# Patient Record
Sex: FEMALE | Race: WHITE | Employment: FULL TIME
[De-identification: names, ages, dates, MRNs, and addresses within clinical notes are randomized per-mention and may not be internally consistent; named-entity substitution may affect disease eponyms.]

---

## 2017-01-25 ENCOUNTER — OFFICE VISIT (OUTPATIENT)
Dept: OBGYN | Facility: CLINIC | Age: 52
End: 2017-01-25

## 2017-01-25 VITALS
WEIGHT: 125 LBS | DIASTOLIC BLOOD PRESSURE: 70 MMHG | HEIGHT: 66 IN | SYSTOLIC BLOOD PRESSURE: 102 MMHG | BODY MASS INDEX: 20.09 KG/M2

## 2017-01-25 DIAGNOSIS — Z01.419 WOMEN'S ANNUAL ROUTINE GYNECOLOGICAL EXAMINATION: Primary | ICD-10-CM

## 2017-01-25 DIAGNOSIS — Z12.31 ENCOUNTER FOR SCREENING MAMMOGRAM FOR BREAST CANCER: ICD-10-CM

## 2017-01-25 PROCEDURE — 99396 PREV VISIT EST AGE 40-64: CPT | Performed by: NURSE PRACTITIONER

## 2017-01-25 ASSESSMENT — ENCOUNTER SYMPTOMS
SHORTNESS OF BREATH: 0
DIARRHEA: 0
BACK PAIN: 0
WHEEZING: 0
RHINORRHEA: 0
CONSTIPATION: 0
ABDOMINAL PAIN: 0
COUGH: 0

## 2017-04-12 ENCOUNTER — EMPLOYEE WELLNESS (OUTPATIENT)
Dept: OTHER | Age: 52
End: 2017-04-12

## 2017-04-12 LAB
CHOLESTEROL/HDL RATIO: 1.8
CHOLESTEROL: 193 MG/DL
GLUCOSE BLD-MCNC: 87 MG/DL (ref 70–99)
HDLC SERPL-MCNC: 105 MG/DL
LDL CHOLESTEROL: 77 MG/DL (ref 0–130)
PATIENT FASTING?: YES
TRIGL SERPL-MCNC: 57 MG/DL
VLDLC SERPL CALC-MCNC: NORMAL MG/DL (ref 1–30)

## 2017-09-06 ENCOUNTER — HOSPITAL ENCOUNTER (OUTPATIENT)
Age: 52
Discharge: HOME | End: 2017-09-06
Payer: COMMERCIAL

## 2017-09-06 LAB
ABSOLUTE EOS #: 0.1 K/UL (ref 0–0.4)
ABSOLUTE LYMPH #: 1.4 K/UL (ref 1–4.8)
ABSOLUTE MONO #: 0.5 K/UL (ref 0.1–1.2)
ALBUMIN SERPL-MCNC: 4.3 G/DL (ref 3.5–5.2)
ALBUMIN/GLOBULIN RATIO: 1.7 (ref 1–2.5)
ALP BLD-CCNC: 54 U/L (ref 35–104)
ALT SERPL-CCNC: 14 U/L (ref 5–33)
ANION GAP SERPL CALCULATED.3IONS-SCNC: 12 MMOL/L (ref 9–17)
AST SERPL-CCNC: 23 U/L
BASOPHILS # BLD: 1 %
BASOPHILS ABSOLUTE: 0 K/UL (ref 0–0.2)
BILIRUB SERPL-MCNC: 0.45 MG/DL (ref 0.3–1.2)
BILIRUBIN URINE: NEGATIVE
BUN BLDV-MCNC: 21 MG/DL (ref 6–20)
BUN/CREAT BLD: ABNORMAL (ref 9–20)
CALCIUM SERPL-MCNC: 9.2 MG/DL (ref 8.6–10.4)
CHLORIDE BLD-SCNC: 106 MMOL/L (ref 98–107)
CHOLESTEROL/HDL RATIO: 1.9
CHOLESTEROL: 197 MG/DL
CO2: 27 MMOL/L (ref 20–31)
COLOR: YELLOW
COMMENT UA: NORMAL
CREAT SERPL-MCNC: 0.62 MG/DL (ref 0.5–0.9)
DIFFERENTIAL TYPE: NORMAL
EOSINOPHILS RELATIVE PERCENT: 3 %
GFR AFRICAN AMERICAN: >60 ML/MIN
GFR NON-AFRICAN AMERICAN: >60 ML/MIN
GFR SERPL CREATININE-BSD FRML MDRD: ABNORMAL ML/MIN/{1.73_M2}
GFR SERPL CREATININE-BSD FRML MDRD: ABNORMAL ML/MIN/{1.73_M2}
GLUCOSE BLD-MCNC: 92 MG/DL (ref 70–99)
GLUCOSE URINE: NEGATIVE
HCT VFR BLD CALC: 40.8 % (ref 36–46)
HDLC SERPL-MCNC: 104 MG/DL
HEMOGLOBIN: 13.7 G/DL (ref 12–16)
KETONES, URINE: NEGATIVE
LDL CHOLESTEROL: 84 MG/DL (ref 0–130)
LEUKOCYTE ESTERASE, URINE: NEGATIVE
LYMPHOCYTES # BLD: 24 %
MCH RBC QN AUTO: 30.6 PG (ref 26–34)
MCHC RBC AUTO-ENTMCNC: 33.7 G/DL (ref 31–37)
MCV RBC AUTO: 90.8 FL (ref 80–100)
MONOCYTES # BLD: 8 %
NITRITE, URINE: NEGATIVE
PDW BLD-RTO: 13.8 % (ref 12.5–15.4)
PH UA: 6 (ref 5–8)
PLATELET # BLD: 163 K/UL (ref 140–450)
PLATELET ESTIMATE: NORMAL
PMV BLD AUTO: 9.1 FL (ref 6–12)
POTASSIUM SERPL-SCNC: 4.3 MMOL/L (ref 3.7–5.3)
PROTEIN UA: NEGATIVE
RBC # BLD: 4.49 M/UL (ref 4–5.2)
RBC # BLD: NORMAL 10*6/UL
SEG NEUTROPHILS: 64 %
SEGMENTED NEUTROPHILS ABSOLUTE COUNT: 3.8 K/UL (ref 1.8–7.7)
SODIUM BLD-SCNC: 145 MMOL/L (ref 135–144)
SPECIFIC GRAVITY UA: 1.02 (ref 1–1.03)
TOTAL PROTEIN: 6.9 G/DL (ref 6.4–8.3)
TRIGL SERPL-MCNC: 44 MG/DL
TURBIDITY: CLEAR
URINE HGB: NEGATIVE
UROBILINOGEN, URINE: NORMAL
VLDLC SERPL CALC-MCNC: NORMAL MG/DL (ref 1–30)
WBC # BLD: 5.9 K/UL (ref 3.5–11)
WBC # BLD: NORMAL 10*3/UL

## 2017-09-06 PROCEDURE — 81003 URINALYSIS AUTO W/O SCOPE: CPT

## 2017-09-06 PROCEDURE — 80061 LIPID PANEL: CPT

## 2017-09-06 PROCEDURE — 85025 COMPLETE CBC W/AUTO DIFF WBC: CPT

## 2017-09-06 PROCEDURE — 80053 COMPREHEN METABOLIC PANEL: CPT

## 2017-09-06 PROCEDURE — 36415 COLL VENOUS BLD VENIPUNCTURE: CPT

## 2017-10-04 ENCOUNTER — HOSPITAL ENCOUNTER (OUTPATIENT)
Dept: PHYSICAL THERAPY | Facility: CLINIC | Age: 52
Setting detail: THERAPIES SERIES
Discharge: HOME | End: 2017-10-04
Payer: COMMERCIAL

## 2017-10-04 PROCEDURE — 97110 THERAPEUTIC EXERCISES: CPT

## 2017-10-04 PROCEDURE — 97530 THERAPEUTIC ACTIVITIES: CPT

## 2017-10-04 PROCEDURE — 97161 PT EVAL LOW COMPLEX 20 MIN: CPT

## 2017-10-04 NOTE — CONSULTS
[] Sohail Last        Outpatient Physical                Therapy       [address]       Phone: [phone]       Fax: [phone] [] [facility]       Promotion at [address] [facility]       Phone: [phone]       Fax: [phone] [x] More[facility] Malave      for Health Promotion    [address] [facility]     Phone: [phone]     Fax:  [phone]      Physical Therapy General Evaluation     Date:  10/4/2017  Patient: Agusto Sadler                                        : 1965                                        MRN: [id number]  Physician: Makenna Leon  Insurance: MMO/Fpath(no vs limit)  Medical Diagnosis: Urinary Incontinence    Rehab Codes: M62.5  Onset Date: 17(script)   Next 's appt: PRN      Subjective:   CC:Pt is a 50 yo female who presents with complaints stress incontinence that has been ongoing about 2 years.    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

## 2017-10-13 ENCOUNTER — HOSPITAL ENCOUNTER (OUTPATIENT)
Dept: PHYSICAL THERAPY | Facility: CLINIC | Age: 52
Setting detail: THERAPIES SERIES
Discharge: HOME | End: 2017-10-13
Payer: COMMERCIAL

## 2017-10-13 PROCEDURE — 97112 NEUROMUSCULAR REEDUCATION: CPT

## 2017-10-13 PROCEDURE — 97110 THERAPEUTIC EXERCISES: CPT

## 2017-10-25 ENCOUNTER — HOSPITAL ENCOUNTER (OUTPATIENT)
Dept: PHYSICAL THERAPY | Facility: CLINIC | Age: 52
Setting detail: THERAPIES SERIES
Discharge: HOME | End: 2017-10-25
Payer: COMMERCIAL

## 2017-10-25 PROCEDURE — 97110 THERAPEUTIC EXERCISES: CPT

## 2017-10-25 PROCEDURE — 97112 NEUROMUSCULAR REEDUCATION: CPT

## 2017-10-27 ENCOUNTER — APPOINTMENT (OUTPATIENT)
Dept: PHYSICAL THERAPY | Facility: CLINIC | Age: 52
End: 2017-10-27
Payer: COMMERCIAL

## 2017-11-21 ENCOUNTER — HOSPITAL ENCOUNTER (OUTPATIENT)
Dept: PHYSICAL THERAPY | Facility: CLINIC | Age: 52
Setting detail: THERAPIES SERIES
Discharge: HOME | End: 2017-11-21
Payer: COMMERCIAL

## 2017-11-21 PROCEDURE — 97110 THERAPEUTIC EXERCISES: CPT

## 2017-11-21 PROCEDURE — 97112 NEUROMUSCULAR REEDUCATION: CPT

## 2017-11-21 NOTE — FLOWSHEET NOTE
[] Liliana Gallegos       Outpatient Physical        Therapy       [address]       Phone: [phone]       Fax: [phone] [] [facility] at [address]       Phone: [phone]       Fax: [phone] [x] Matteo. [address]   Phone: [phone]   Fax:  [phone]     Physical Therapy Daily Treatment Note/Discharge Note    Date:  2017  Patient Name:  Oscar Huertas    :  1965  MRN: [id number]  Physician: Osiel Anthony  Insurance: MMO/Fpath(no vs limit)  Medical Diagnosis: Urinary Incontinence    Rehab Codes: M62.5  Onset Date: 17(script)              Next 's appt: PRN    Visit# / total visits: /  Cancels/No Shows:     Subjective:    Pain:  [] Yes  [x] No Location:  N/A Pain Rating: (0-10 scale) 0/10  Pain altered Tx:  [x] No  [] Yes  Action:  Comments:Pt states that she is pleased with her progress. No leaking and is able to hold her urine during a whole exercise session. Objective:  Modalities:   Precautions:  Exercises:  Exercise Reps/ Time Weight/ Level Comments   Pelvic model explanation            Urine stop test            PF ex: long and quick             The knack ex              bridges/clam shell ex            Other:    Specific Instructions for next treatment:    Treatment Charges: Mins Units   []  Modalities     [x]  Ther Exercise 15 1   []  Manual Therapy     []  Ther Activities     []  Aquatics     []  Vasocompression     [x]  Other: NR 10 1   Total Treatment time 25 2       Assessment: [x] Progressing toward goals. [] No change. [x] Other: Hooked pt up to biofeedback. Great progress noted. Low resting tone. Hold time of 9-10 seconds. Good coordination noted. Reviewed HEP. Discussion with pt that I feel she can cont with her Independent HEP at this time. Pt in agreement. Will discharge PT at this time. Goals:.   STG: (to be met in 4 treatments)  1. Able to isolate PF musculature_MET  2. ? Strength:PF 3/5-MET  3. ? Function:no reports of leaking with cough, sneeze and laugh-MET  4. Independent with Home Exercise Programs-MET  LTG: (to be met in 8 treatments)  1. PF 4/5-MET  2. Able to perform all advanced ADL's without leaking-MET        Pt. Education:  [x] Yes  [] No  [x] Reviewed Prior HEP/Ed  Method of Education: [x] Verbal  [] Demo  [] Written  Comprehension of Education:  [x] Verbalizes understanding. [x] Demonstrates understanding. [] Needs review. [] Demonstrates/verbalizes HEP/Ed previously given. Plan: [] Continue per plan of care.    [x] Other:Discharge PT      Time In:1000            Time Out: 7045    Electronically signed by:  Nanda Brambila PT

## 2017-11-22 NOTE — FLOWSHEET NOTE
pt return in a couple of weeks to allow ample time to work on [employer]. Goals:. STG: (to be met in 4 treatments)  1. Able to isolate PF musculature  2. ? Strength:PF 3/5-  3. ? Function:no reports of leaking with cough, sneeze and laugh-  4. Independent with Home Exercise Programs-  LTG: (to be met in 8 treatments)  1. PF 4/5-       Able to perform all advanced ADL's without leaking-      Pt. Education:  [x] Yes  [] No  [x] Reviewed Prior HEP/Ed  Method of Education: [x] Verbal  [] Demo  [] Written  Comprehension of Education:  [x] Verbalizes understanding. [x] Demonstrates understanding. [] Needs review. [] Demonstrates/verbalizes HEP/Ed previously given. Plan: [x] Continue per plan of care.    [] Other:      Time [id number]:[id number]          Time Out: 7324    Electronically signed by:  Ayden Camarillo, PT

## 2017-11-22 NOTE — FLOWSHEET NOTE
[] Estela Schuler       Outpatient Physical        Therapy       [address]       Phone: [phone]       Fax: [phone] [] [facility] at [address]       Phone: [phone]       Fax: [phone] [x] Matteo. [address] [facility]  [address] [place] [address]   Phone: [phone]   Fax:  [phone]     Physical Therapy Daily Treatment Note/Discharge Note    Date:  10/25/2017  Patient Name:  Aydin Ochoa    :  1965  MRN: [id number]  Physician: Cindy Bello  Insurance: MMO/Fpath(no vs limit)  Medical Diagnosis: Urinary Incontinence    Rehab Codes: M62.5  Onset Date: 17(script)              Next 's appt: PRN    Visit# / total visits: 3  Cancels/No Shows:     Subjective:    Pain:  [] Yes  [x] No Location:  N/A Pain Rating: (0-10 scale) 0/10  Pain altered Tx:  [x] No  [] Yes  Action:  Comments:Pt states that she has really been doing well. Objective:  Modalities:   Precautions:  Exercises:  Exercise Reps/ Time Weight/ Level Comments   Pelvic model explanation            Urine stop test            PF ex: long and quick             The knack ex              bridges/clam shell ex            Other:    Specific Instructions for next treatment:Biofeedback    Treatment Charges: Mins Units   []  Modalities     [x]  Ther Exercise 25 2   []  Manual Therapy     []  Ther Activities     []  Aquatics     []  Vasocompression     [x]  Other: NR 15 1   Total Treatment time 40 3       Assessment: [x] Progressing toward goals. [] No change. [x] Other: Hooked pt up to biofeedback. Noted lower resting tone. Able to isolate PF musculature. Hold time of about 5 seconds. Coordination improved. Workeded with karl graphic on strength, relaxation and force closure. Pt with improved understanding. Cont with EX. Reviewed HEP. Will have pt return in a few weeks to allow ample time to work on [employer]. Goals:.   STG: (to be met in 4 treatments)  1. Able to isolate PF musculature  2. ? Strength:PF 3/5-  3. ? Function:no reports of leaking with cough, sneeze and laugh-  4. Independent with Home Exercise Programs-  LTG: (to be met in 8 treatments)  1. PF 4/5-       Able to perform all advanced ADL's without leaking-      Pt. Education:  [x] Yes  [] No  [x] Reviewed Prior HEP/Ed  Method of Education: [x] Verbal  [] Demo  [] Written  Comprehension of Education:  [x] Verbalizes understanding. [x] Demonstrates understanding. [] Needs review. [] Demonstrates/verbalizes HEP/Ed previously given. Plan: [x] Continue per plan of care.    [] Other:      Time In:1300          Time Out: 1350    Electronically signed by:  Kenroy Smith, PT

## 2018-01-30 ENCOUNTER — HOSPITAL ENCOUNTER (OUTPATIENT)
Age: 53
Setting detail: SPECIMEN
Discharge: HOME | End: 2018-01-30
Payer: COMMERCIAL

## 2018-01-30 ENCOUNTER — OFFICE VISIT (OUTPATIENT)
Dept: OBGYN CLINIC | Age: 53
End: 2018-01-30
Payer: COMMERCIAL

## 2018-01-30 VITALS
BODY MASS INDEX: 21.43 KG/M2 | WEIGHT: 128.6 LBS | SYSTOLIC BLOOD PRESSURE: 92 MMHG | DIASTOLIC BLOOD PRESSURE: 68 MMHG | HEIGHT: 65 IN

## 2018-01-30 DIAGNOSIS — Z12.31 ENCOUNTER FOR SCREENING MAMMOGRAM FOR BREAST CANCER: ICD-10-CM

## 2018-01-30 DIAGNOSIS — Z01.419 ENCOUNTER FOR GYNECOLOGICAL EXAMINATION WITHOUT ABNORMAL FINDING: Primary | ICD-10-CM

## 2018-01-30 PROCEDURE — 99396 PREV VISIT EST AGE 40-64: CPT | Performed by: NURSE PRACTITIONER

## 2018-01-30 ASSESSMENT — ENCOUNTER SYMPTOMS
BACK PAIN: 0
ABDOMINAL DISTENTION: 0
CONSTIPATION: 0
ABDOMINAL PAIN: 0
SHORTNESS OF BREATH: 0
COUGH: 0
DIARRHEA: 0

## 2018-01-30 NOTE — PROGRESS NOTES
breath. Cardiovascular: Negative for chest pain and palpitations. Gastrointestinal: Negative for abdominal distention, abdominal pain, constipation and diarrhea. Genitourinary: Negative for flank pain, frequency, menstrual problem, pelvic pain and vaginal discharge. Musculoskeletal: Negative for back pain. Neurological: Negative for syncope and headaches. Psychiatric/Behavioral: Negative for behavioral problems. Objective:     BP 92/68 (Site: Right Arm, Position: Sitting, Cuff Size: Medium Adult)   Ht 5' 5.25\" (1.657 m)   Wt 128 lb 9.6 oz (58.3 kg)   Breastfeeding? No   BMI 21.24 kg/m²   Physical Exam   Constitutional: She is oriented to person, place, and time. She appears well-developed and well-nourished. Eyes: Pupils are equal, round, and reactive to light. Neck: No tracheal deviation present. No thyromegaly present. Cardiovascular: Normal rate, regular rhythm and normal heart sounds. Pulmonary/Chest: Effort normal and breath sounds normal. No respiratory distress. Right breast exhibits no inverted nipple. Left breast exhibits no inverted nipple, no mass, no nipple discharge, no skin change and no tenderness. Breasts are symmetrical.   Abdominal: Soft. Bowel sounds are normal. She exhibits no distension and no mass. There is no tenderness. There is no CVA tenderness. Hernia confirmed negative in the right inguinal area and confirmed negative in the left inguinal area. Genitourinary: No breast swelling, tenderness, discharge or bleeding. There is no rash or lesion on the right labia. There is no rash or lesion on the left labia. Uterus is not deviated, not enlarged and not fixed. Cervix exhibits no motion tenderness, no discharge and no friability. Right adnexum displays no mass, no tenderness and no fullness. Left adnexum displays no mass, no tenderness and no fullness. No tenderness in the vagina. No vaginal discharge found.    Musculoskeletal: She exhibits no edema or tenderness. Lymphadenopathy:     She has no cervical adenopathy. Right: No inguinal adenopathy present. Left: No inguinal adenopathy present. Neurological: She is alert and oriented to person, place, and time. Skin: Skin is warm and dry. Psychiatric: She has a normal mood and affect. Her behavior is normal. Judgment and thought content normal.         Assessment:     1. Encounter for gynecological examination without abnormal finding  PAP SMEAR   2. Encounter for screening mammogram for breast cancer  PITER DIGITAL SCREEN W CAD BILATERAL         Plan:   1. Pap collected. Discussed new pap smear guidelines. Desires re-pap in 1 year. 2. Screening mammogram discussed and advised yearly if within normal limits. 3. Calcium and Vitamin D dosing reviewed. 4. Colonoscopy screening reviewed. 5. Bone density testing reviewed.      Electronically signed by Azucena Melissa on 1/30/2018

## 2018-02-08 LAB — CYTOLOGY REPORT: NORMAL

## 2018-03-05 ENCOUNTER — HOSPITAL ENCOUNTER (OUTPATIENT)
Dept: MAMMOGRAPHY | Age: 53
Discharge: HOME | End: 2018-03-07
Payer: COMMERCIAL

## 2018-03-05 DIAGNOSIS — Z12.31 ENCOUNTER FOR SCREENING MAMMOGRAM FOR BREAST CANCER: ICD-10-CM

## 2018-03-05 PROCEDURE — 77067 SCR MAMMO BI INCL CAD: CPT

## 2018-03-20 VITALS — WEIGHT: 125 LBS | BODY MASS INDEX: 20.18 KG/M2

## 2019-02-11 ENCOUNTER — HOSPITAL ENCOUNTER (OUTPATIENT)
Age: 54
Setting detail: SPECIMEN
Discharge: HOME | End: 2019-02-11
Payer: COMMERCIAL

## 2019-02-11 ENCOUNTER — OFFICE VISIT (OUTPATIENT)
Dept: OBGYN CLINIC | Age: 54
End: 2019-02-11
Payer: COMMERCIAL

## 2019-02-11 VITALS
BODY MASS INDEX: 20.15 KG/M2 | HEIGHT: 66 IN | SYSTOLIC BLOOD PRESSURE: 104 MMHG | DIASTOLIC BLOOD PRESSURE: 80 MMHG | WEIGHT: 125.4 LBS

## 2019-02-11 DIAGNOSIS — Z01.419 ENCOUNTER FOR GYNECOLOGICAL EXAMINATION: Primary | ICD-10-CM

## 2019-02-11 DIAGNOSIS — N95.1 VAGINAL DRYNESS, MENOPAUSAL: ICD-10-CM

## 2019-02-11 DIAGNOSIS — R68.82 LOW LIBIDO: ICD-10-CM

## 2019-02-11 DIAGNOSIS — Z12.31 ENCOUNTER FOR SCREENING MAMMOGRAM FOR BREAST CANCER: ICD-10-CM

## 2019-02-11 PROCEDURE — 99396 PREV VISIT EST AGE 40-64: CPT | Performed by: NURSE PRACTITIONER

## 2019-02-11 RX ORDER — LANOLIN ALCOHOL/MO/W.PET/CERES
3 CREAM (GRAM) TOPICAL DAILY
COMMUNITY

## 2019-02-11 RX ORDER — MULTIVITAMIN WITH IRON
250 TABLET ORAL DAILY
COMMUNITY

## 2019-02-11 ASSESSMENT — ENCOUNTER SYMPTOMS
SHORTNESS OF BREATH: 0
BACK PAIN: 0
ABDOMINAL DISTENTION: 0
CONSTIPATION: 0
DIARRHEA: 0
COUGH: 0
ABDOMINAL PAIN: 0

## 2019-02-22 LAB — CYTOLOGY REPORT: NORMAL

## 2019-03-15 ENCOUNTER — HOSPITAL ENCOUNTER (OUTPATIENT)
Dept: MAMMOGRAPHY | Age: 54
Discharge: HOME | End: 2019-03-17
Payer: COMMERCIAL

## 2019-03-15 DIAGNOSIS — Z12.31 ENCOUNTER FOR SCREENING MAMMOGRAM FOR BREAST CANCER: ICD-10-CM

## 2019-03-15 PROCEDURE — 77063 BREAST TOMOSYNTHESIS BI: CPT

## 2025-01-13 ENCOUNTER — OFFICE VISIT (OUTPATIENT)
Dept: ORTHOPEDIC SURGERY | Age: 60
End: 2025-01-13
Payer: COMMERCIAL

## 2025-01-13 VITALS — BODY MASS INDEX: 20.83 KG/M2 | HEIGHT: 65 IN | OXYGEN SATURATION: 100 % | RESPIRATION RATE: 16 BRPM | WEIGHT: 125 LBS

## 2025-01-13 DIAGNOSIS — S82.832A: Primary | ICD-10-CM

## 2025-01-13 DIAGNOSIS — M25.572 LEFT ANKLE PAIN, UNSPECIFIED CHRONICITY: Primary | ICD-10-CM

## 2025-01-13 PROCEDURE — 29405 APPL SHORT LEG CAST: CPT | Performed by: PHYSICIAN ASSISTANT

## 2025-01-13 PROCEDURE — 99204 OFFICE O/P NEW MOD 45 MIN: CPT | Performed by: PHYSICIAN ASSISTANT

## 2025-01-13 ASSESSMENT — ENCOUNTER SYMPTOMS
RESPIRATORY NEGATIVE: 1
SHORTNESS OF BREATH: 0
COUGH: 0
COLOR CHANGE: 0
VOMITING: 0
CHEST TIGHTNESS: 0
NAUSEA: 0
DIARRHEA: 0
CONSTIPATION: 0
ABDOMINAL DISTENTION: 0
APNEA: 0
GASTROINTESTINAL NEGATIVE: 1
ABDOMINAL PAIN: 0

## 2025-01-13 NOTE — PATIENT INSTRUCTIONS
Patient Education        Wearing a Fiberglass Cast: Care Instructions  Overview     A cast protects a broken bone or other injury while it heals. Most casts are made of fiberglass. After a cast is put on, you can't remove it yourself. Your doctor or a technician will take it off.  Follow-up care is a key part of your treatment and safety. Be sure to make and go to all appointments, and call your doctor if you are having problems. It's also a good idea to know your test results and keep a list of the medicines you take.  How can you care for yourself at home?  General care  Follow your doctor's instructions for when you can start using the limb that has the cast. Fiberglass casts dry quickly and are soon hard enough to protect the injured arm or leg.  When it's okay to put weight on your leg or foot cast, don't stand or walk on it unless it's designed for walking.  Prop up the injured arm or leg on a pillow anytime you sit or lie down during the first 3 days. Try to keep it above the level of your heart. This will help reduce swelling.  Put ice or a cold pack on your cast for 10 to 20 minutes at a time. Try to do this every 1 to 2 hours for the next 3 days (when you are awake). Put a thin cloth between the ice and your cast. Keep the cast dry.  Be safe with medicines. Read and follow all instructions on the label.  If the doctor gave you a prescription medicine for pain, take it as prescribed.  If you are not taking a prescription pain medicine, ask your doctor if you can take an over-the-counter medicine.  Do exercises as instructed by your doctor or physical therapist. These exercises will help keep your muscles strong and your joints flexible while you heal.  Wiggle your fingers or toes on the injured arm or leg often. This helps reduce swelling and stiffness.  Water and your cast  Try to keep your cast as dry as you can. The fiberglass part of your cast can get wet. But getting the inside wet can cause

## 2025-01-13 NOTE — PROGRESS NOTES
[facility] PHYSICIANS [facility] ORTHOPEDICS AND SPORTS MEDICINE  [address]  [facility] [address]  Dept: [phone]  Dept Fax: [phone]        Fracture New patient-Ed follow up      Subjective:     Chief Complaint   Patient presents with    Ankle Pain     Left - Fell on 1/11/25 - Taking Tylenol/Motrin, using ice      HPI:     History of Present Illness  The patient is a 59-year-old female who presents as a new patient for an ED follow-up for a left ankle fracture.    She was seen at the [facility] on 11/11/2025 after she fell slipping on ice. She was diagnosed with a nondisplaced oblique fracture of the distal fibular metaphysis. She presents today in a splint and on crutches, nonweightbearing with her . She is using Tylenol and ibuprofen for pain management. She has also tried ice and elevation. She has never had an injury to this ankle previously. She reports no numbness or tingling sensations. She has a history of osteopenia.  She has a scheduled flight on 01/23/2025, which she is considering canceling due to her current condition.    SOCIAL HISTORY  She is retired but cares for her grandchildren.    MEDICATIONS  Tylenol, ibuprofen    ROS:     Review of Systems   Constitutional:  Positive for activity change. Negative for appetite change, fatigue and fever.   Respiratory: Negative.  Negative for apnea, cough, chest tightness and shortness of breath.    Cardiovascular: Negative.  Negative for chest pain, palpitations and leg swelling.   Gastrointestinal: Negative.  Negative for abdominal distention, abdominal pain, constipation, diarrhea, nausea and vomiting.   Genitourinary: Negative.  Negative for difficulty urinating, dysuria and hematuria.   Musculoskeletal:  Positive for arthralgias, gait problem and joint swelling. Negative for myalgias.   Skin: Negative.  Negative for color change and rash.   Neurological:  Negative for

## 2025-01-16 DIAGNOSIS — S82.832A: Primary | ICD-10-CM

## 2025-01-21 ENCOUNTER — OFFICE VISIT (OUTPATIENT)
Dept: ORTHOPEDIC SURGERY | Age: 60
End: 2025-01-21
Payer: COMMERCIAL

## 2025-01-21 VITALS — OXYGEN SATURATION: 97 % | BODY MASS INDEX: 20.83 KG/M2 | WEIGHT: 125 LBS | HEIGHT: 65 IN | RESPIRATION RATE: 18 BRPM

## 2025-01-21 DIAGNOSIS — S82.832D TRAUMATIC CLOSED NONDISPLACED FRACTURE OF DISTAL END OF LEFT FIBULA, WITH ROUTINE HEALING, SUBSEQUENT ENCOUNTER: Primary | ICD-10-CM

## 2025-01-21 PROCEDURE — 99213 OFFICE O/P EST LOW 20 MIN: CPT | Performed by: PHYSICIAN ASSISTANT

## 2025-01-21 ASSESSMENT — ENCOUNTER SYMPTOMS
SHORTNESS OF BREATH: 0
RESPIRATORY NEGATIVE: 1
COLOR CHANGE: 0
APNEA: 0
ABDOMINAL PAIN: 0
GASTROINTESTINAL NEGATIVE: 1
CHEST TIGHTNESS: 0
VOMITING: 0
COUGH: 0
ABDOMINAL DISTENTION: 0
DIARRHEA: 0
NAUSEA: 0
CONSTIPATION: 0

## 2025-01-21 NOTE — PATIENT INSTRUCTIONS
PATIENTIQ:  PatientIQ helps [facility] stay in touch with you to know how you're feeling, and provides education and care instructions to you at various time points.   Your answers help your care team track your progress to provide the best care possible. PatientIQ will contact you pre-op and post-op via email or text with:  Educational Videos and Care Instructions  Questionnaires About How You're Feeling    Your participation provides you valuable education and helps [facility] continue to provide quality care to all patients. Thank you

## 2025-01-21 NOTE — PROGRESS NOTES
to continue with her current regimen of calcium and vitamin D supplements. The healing process could extend up to 3 months, but around the 3-week jordi, she will be transitioned from a cast to a boot. At the 6-week jordi, physical therapy will be initiated.  Patient will be placed in a boot at her next appointment and she will remain nonweightbearing.  She was instructed to maintain a healthy diet rich in calcium. Weightbearing activities will be restricted for approximately 8 weeks, depending on the x-ray findings. At the 6-week jordi, she may begin weightbearing as tolerated in her boot, gradually transitioning from 2 crutches to 1 crutch, and eventually to no crutches over the subsequent 4 to 5 weeks. By the 10-week jordi, she should be able to bear weight and transition into a shoe. She was advised to order a medium-sized waterproof boot cover and an Evenup device for use when she begins walking in the boot. S She was advised to gradually reduce her Aleve intake and rely solely on Tylenol for pain management. If necessary, she can take Aleve or ibuprofen, but not on a regular basis. FMLA paperwork will be completed for her 's intermittent time off for appointments over the next 12 weeks.    Follow-up  The patient is scheduled for a follow-up visit on 01/30/2025.      No orders of the defined types were placed in this encounter.      No orders of the defined types were placed in this encounter.        The patient (or guardian, if applicable) and other individuals in attendance with the patient were advised that Artificial Intelligence will be utilized during this visit to record, process the conversation to generate a clinical note, and support improvement of the AI technology. The patient (or guardian, if applicable) and other individuals in attendance at the appointment consented to the use of AI, including the recording.        This note is created with the assistance of a speech recognition program.  While

## 2025-01-23 DIAGNOSIS — S82.832D TRAUMATIC CLOSED NONDISPLACED FRACTURE OF DISTAL END OF LEFT FIBULA, WITH ROUTINE HEALING, SUBSEQUENT ENCOUNTER: Primary | ICD-10-CM

## 2025-01-30 ENCOUNTER — OFFICE VISIT (OUTPATIENT)
Dept: ORTHOPEDIC SURGERY | Age: 60
End: 2025-01-30
Payer: COMMERCIAL

## 2025-01-30 VITALS — RESPIRATION RATE: 18 BRPM | BODY MASS INDEX: 20.83 KG/M2 | OXYGEN SATURATION: 98 % | HEIGHT: 65 IN | WEIGHT: 125 LBS

## 2025-01-30 DIAGNOSIS — S82.832D TRAUMATIC CLOSED NONDISPLACED FRACTURE OF DISTAL END OF LEFT FIBULA, WITH ROUTINE HEALING, SUBSEQUENT ENCOUNTER: Primary | ICD-10-CM

## 2025-01-30 PROCEDURE — 99213 OFFICE O/P EST LOW 20 MIN: CPT | Performed by: PHYSICIAN ASSISTANT

## 2025-01-30 ASSESSMENT — ENCOUNTER SYMPTOMS
NAUSEA: 0
APNEA: 0
CHEST TIGHTNESS: 0
SHORTNESS OF BREATH: 0
ABDOMINAL DISTENTION: 0
COLOR CHANGE: 0
CONSTIPATION: 0
DIARRHEA: 0
RESPIRATORY NEGATIVE: 1
VOMITING: 0
ABDOMINAL PAIN: 0
COUGH: 0
GASTROINTESTINAL NEGATIVE: 1

## 2025-01-30 NOTE — PROGRESS NOTES
[facility] ORTHOPEDICS AND SPORTS MEDICINE  [address]  [facility] [address]  Dept: [phone]  Dept Fax: [phone]        Fracture follow-up      Subjective:     Chief Complaint   Patient presents with    Ankle Pain     Left ankle pain     HPI:     History of Present Illness  The patient is here for a follow-up of her left ankle fracture. The injury occurred on 01/11/2025, making her 2 weeks and 5 days post-injury. She has been diligent in maintaining nonweightbearing status and is using a scooter for mobility. Today, her cast was removed with the plan to transition her into a tall walking boot. Her cast was in good repair.    She reports overall good health but experiences mild soreness in the affected area, predominantly in the evenings. She has a compression sock at home and is considering the use of ice for pain management. She does not have a shower chair and has been managing personal hygiene by rolling herself into the bathtub. She is scheduled to fly on 02/23/2025 and is seeking advice on whether she can continue with her travel plans.    ROS:     Review of Systems   Constitutional:  Positive for activity change. Negative for appetite change, fatigue and fever.   Respiratory: Negative.  Negative for apnea, cough, chest tightness and shortness of breath.    Cardiovascular: Negative.  Negative for chest pain, palpitations and leg swelling.   Gastrointestinal: Negative.  Negative for abdominal distention, abdominal pain, constipation, diarrhea, nausea and vomiting.   Genitourinary: Negative.  Negative for difficulty urinating, dysuria and hematuria.   Musculoskeletal:  Positive for arthralgias, gait problem and joint swelling. Negative for myalgias.   Skin: Negative.  Negative for color change and rash.   Neurological:  Negative for dizziness, weakness, numbness and headaches.   Psychiatric/Behavioral:  Positive for sleep

## 2025-01-30 NOTE — PATIENT INSTRUCTIONS
PATIENTIQ:  PatientIQ helps [facility] stay in touch with you to know how you're feeling, and provides education and care instructions to you at various time points.   Your answers help your care team track your progress to provide the best care possible. PatientIQ will contact you pre-op and post-op via email or text with:  Educational Videos and Care Instructions  Questionnaires About How You're Feeling    Your participation provides you valuable education and helps [facility] continue to provide quality care to all patients. Thank you

## 2025-02-18 DIAGNOSIS — S82.832D TRAUMATIC CLOSED NONDISPLACED FRACTURE OF DISTAL END OF LEFT FIBULA, WITH ROUTINE HEALING, SUBSEQUENT ENCOUNTER: Primary | ICD-10-CM

## 2025-02-19 NOTE — PROGRESS NOTES
[facility] ORTHOPEDICS AND SPORTS MEDICINE  [address]  [facility] [address]  Dept: [phone]  Dept Fax: [phone]        Fracture follow-up      Subjective:     Chief Complaint   Patient presents with    Ankle Pain     Left ankle pain-     HPI:     Shiela is here today for follow-up of her left ankle fracture.  Date of injury was 1/11/2025.  Therefore the patient is almost 6 weeks postinjury.  At the patient's last visit on 1/30/2025 she was removed from hard cast and put into a walking boot.    History of Present Illness  The patient presents for evaluation of an ankle fracture.    She is currently in the non-weightbearing phase of her recovery, which is scheduled to transition into a weightbearing phase in the upcoming week. She has been adhering to the use of a boot and has not applied any weight on the affected ankle. Her daily routine includes performing exercises twice, specifically alphabet and flexing exercises. She reports no pain when weight is applied to the ankle. However, she notes swelling in one area of the ankle, which she suspects may be indicative of a ligament injury. She has not attempted to bear weight on the ankle, even for short distances such as to the bathroom. She possesses compression socks and an Evenup at home. She has not been taking any analgesics but has been on a regimen of baby aspirin once daily for the past week. She utilizes crutches for mobility within her home. She inquires about the possibility of resuming Advil for pain management.    MEDICATIONS  Current: baby aspirin    ROS:     Review of Systems   Constitutional:  Positive for activity change. Negative for appetite change, fatigue and fever.   Respiratory: Negative.  Negative for apnea, cough, chest tightness and shortness of breath.    Cardiovascular: Negative.  Negative for chest pain, palpitations and leg swelling.

## 2025-02-20 ENCOUNTER — OFFICE VISIT (OUTPATIENT)
Dept: ORTHOPEDIC SURGERY | Age: 60
End: 2025-02-20

## 2025-02-20 ENCOUNTER — TELEPHONE (OUTPATIENT)
Dept: ORTHOPEDIC SURGERY | Age: 60
End: 2025-02-20

## 2025-02-20 VITALS — RESPIRATION RATE: 18 BRPM | OXYGEN SATURATION: 99 % | WEIGHT: 125 LBS | BODY MASS INDEX: 20.83 KG/M2 | HEIGHT: 65 IN

## 2025-02-20 DIAGNOSIS — S82.832D TRAUMATIC CLOSED NONDISPLACED FRACTURE OF DISTAL END OF LEFT FIBULA, WITH ROUTINE HEALING, SUBSEQUENT ENCOUNTER: Primary | ICD-10-CM

## 2025-02-20 ASSESSMENT — ENCOUNTER SYMPTOMS
ABDOMINAL PAIN: 0
SHORTNESS OF BREATH: 0
CHEST TIGHTNESS: 0
APNEA: 0
COUGH: 0
DIARRHEA: 0
VOMITING: 0
NAUSEA: 0
GASTROINTESTINAL NEGATIVE: 1
ABDOMINAL DISTENTION: 0
CONSTIPATION: 0
RESPIRATORY NEGATIVE: 1
COLOR CHANGE: 0

## 2025-02-20 NOTE — TELEPHONE ENCOUNTER
Pt would like to know if she still needs to wear the boot at night while she sleeps.     Phone: [phone]  OK to leave detailed voicemail message

## 2025-02-20 NOTE — TELEPHONE ENCOUNTER
Spoke with Mariel. She said it is OK to sleep without it, but to make sure she had it near her when she has to get up and use the restroom, or walk.     Spoke with patient and relayed this information. She stated understanding and had no further questions.

## 2025-02-20 NOTE — PATIENT INSTRUCTIONS
PATIENTIQ:  PatientIQ helps [facility] stay in touch with you to know how you're feeling, and provides education and care instructions to you at various time points.   Your answers help your care team track your progress to provide the best care possible. PatientIQ will contact you pre-op and post-op via email or text with:  Educational Videos and Care Instructions  Questionnaires About How You're Feeling    Your participation provides you valuable education and helps [facility] continue to provide quality care to all patients. Thank you

## 2025-03-04 ENCOUNTER — HOSPITAL ENCOUNTER (OUTPATIENT)
Dept: PHYSICAL THERAPY | Facility: CLINIC | Age: 60
Setting detail: THERAPIES SERIES
Discharge: HOME | End: 2025-03-04
Payer: COMMERCIAL

## 2025-03-04 PROCEDURE — 97110 THERAPEUTIC EXERCISES: CPT

## 2025-03-04 PROCEDURE — 97161 PT EVAL LOW COMPLEX 20 MIN: CPT

## 2025-03-04 NOTE — CONSULTS
Quad cane    [] Standard walker [] Rolling walker   [] 4 wheeled walker [] Standard walker [] Rolling walker   [] 4 wheeled walker    [] Wheelchair [] Wheelchair         Objective:  FUNCTION Normal Difficult Unable   Sitting [] [] []   Standing [] [x] []   Ambulation [] [x] []   Groom/Dress [] [x] []   Lift/Carry [] [] [x]   Stairs [] [x] []   Bending [x] [] []   Squat [] [] [x]   Kneel [] [x] []     OBSERVATION No Deficit Deficit Comments   Posture      Pes cavus [] [x]    Palpation [] [x] Tenderness and pitting edema left lateral foot   Sensation [x] []    Edema [] [x] At malleoli R 23, L 25.5 cm; figure-8  R48.5 cm,  L 49 cm; at base of 5th metatarsal    R 22 cm, L22.4 cm   Neurological [x] []    Gait [] [x] Analysis: WBAT with crutches in boot- good technique           ROM  ° A/P STRENGTH TESTS (+/-) Left Right    Left Right Left Right      ADD   4 5      Knee Flex   4 5      Ext   4 5      Ankle DF 10 from N 8 4- 5      PF 45 50 4- 5      INV 20 19 4- 5 Anterior drawer - -   EV 4 9 4- 5 Talor Tilt - -                   Functional Test: FAAM Score: Raw score of 21/84,   which is 75% functionally impaired     Comments:    Assessment:      Ms. Allen with left distal fibular fracture, presents with impairments in ROM, strength, and function.  Patient would benefit from skilled physical therapy services in order to: improve ROM and strength and return to full function including normal gait. A positive response is expected.  Evaluation followed by instruction in home program.  Tolerated well.       Problem list, as detailed above:   [x] ? Pain     [x] ? ROM    [x] ? Strength    [] ? Function:   [] ? Balance  [x] Edema  [] Postural Deviations  [x] Gait Deviations  [] Other     STG: (to be met in 10 treatments)    ? ROM: left ankle DF to 0 degrees to normalize gait  ? Strength:left ankle to 4/5 to allow for eventual gait without boot  Patient to be independent with home exercise program as demonstrated by performance

## 2025-03-06 ENCOUNTER — HOSPITAL ENCOUNTER (OUTPATIENT)
Dept: PHYSICAL THERAPY | Facility: CLINIC | Age: 60
Setting detail: THERAPIES SERIES
Discharge: HOME | End: 2025-03-06
Payer: COMMERCIAL

## 2025-03-06 PROCEDURE — 97110 THERAPEUTIC EXERCISES: CPT

## 2025-03-06 PROCEDURE — 97016 VASOPNEUMATIC DEVICE THERAPY: CPT

## 2025-03-06 NOTE — FLOWSHEET NOTE
[] [facility]  Outpatient Rehabilitation &  Therapy  [address]  [phone]  [phone] [] [facility]  Outpatient Rehabilitation &  Therapy  [address]  P: [phone]  F: [phone] [x] [facility]  Outpatient Rehabilitation &  Therapy  [address]  P: [phone]  F: [phone] [] [facility]  Outpatient Rehabilitation &  Therapy  [address]  [phone]  [phone] [] [facility]  Outpatient Rehabilitation &  Therapy  [address]   P: [phone]  F: [phone]  [] [facility]  Outpatient Rehabilitation &  Therapy  [address]  P: [phone]  F: [phone] [] [facility]  Outpatient Rehabilitation &  Therapy  [address]  P: [phone]  F: [phone] [] [facility]  Outpatient Rehabilitation &  Therapy  [address]  P: [phone]  F: [phone] [] [facility]  Outpatient Rehabilitation &  Therapy  [address]  P: [phone]  F: [phone]  [] [facility] Outpatient Rehabilitation &  Therapy  [address]  P: [phone]  F: [phone]     Physical Therapy Daily Treatment Note    Date:  3/6/2025  Patient Name:  Shiela Allen    :  1965  MRN: [id number]  Physician: Mariel Unger PA-C                                Insurance: Regis PPO: Yakov yr (3/1/25 - 26) -  vs remain, hard max, not combined; no auth; no copay or ded; coins 20%; oop 2000 remain.CPT codes verified:  83202, 78086, 31762, 90013, 28686, 41792, 58957, 07212, 07070, 75561, 85796 (ALL CODES BASED ON MEDICAL NECESSITY)  Excluded: ,   Medical Diagnosis: Traumatic closed nondisplaced fracture of distal end of left fibula, with routine healing, subsequent encounter (S82.833N)

## 2025-03-10 ENCOUNTER — HOSPITAL ENCOUNTER (OUTPATIENT)
Dept: PHYSICAL THERAPY | Facility: CLINIC | Age: 60
Setting detail: THERAPIES SERIES
Discharge: HOME | End: 2025-03-10
Payer: COMMERCIAL

## 2025-03-10 PROCEDURE — 97016 VASOPNEUMATIC DEVICE THERAPY: CPT

## 2025-03-10 PROCEDURE — 97110 THERAPEUTIC EXERCISES: CPT

## 2025-03-10 NOTE — FLOWSHEET NOTE
Rehab Codes: R26.2, M25.572, M25.672  Onset date: 1/11/25               Next Dr's appt.: 4/3/25       Visit# / total visits: 3/18 PN visit 10   Cancels/No Shows:     Subjective:    Pain:  [x] Yes  [] No Location: L Lateral Ankle and Foot       Pain Rating: (0-10 scale) 2/10  Pain altered Tx:  [] No  [] Yes  Action:  Comments: Overall doing well and denies any soreness after last session. Has been icing frequently at home. Does report continued pain along the L inferior lateral malleolus and presents with an indentation in her skin from the boot. Reduced edema assessed.     Objective:      Today’s Treatment:  Modalities: Vaso compression 15' 36 degrees low pressure  Precautions [] No  [x] Yes: WBAT in boot until 3/20/25  Exercises:  Exercise Reps/ Time Weight/ Level Comments   Ankle pumps 15       IV/EV 20       Alphabet 2 A-Z       Calf stretch- long sit 3@30\"       SLR flexion 20       SLR abduction 20       SLR prone 20       Seated toe raises 20       Seated heel raises 20       Seated arch lifts 20x3\"       Toe yoga 15ea   Improved GT extension    LAX Ball Rolling  2'     TB Toe Press 10x [place]              4-way ankle T-band 20x [place]     Seated Mobo 20x     1/3, 2/4                                           Other:     Specific Instructions for next treatment: ROM, light strengthening        Treatment Charges: Mins Units Time In/Out   []  Modalities        [x]  Ther Exercise 35 2 3:00-3:35    []  Neuromuscular Re-ed      []  Gait Training      []  Manual Therapy      []  Ther Activities      []  Aquatics      [x]  Vasocompression 15 1 3:35-3:50   []  Cervical Traction      []  Other      Total Billable time  50min            Assessment: [x] Progressing toward goals. Discussed adding a small piece of foam to the CAM boot and/or contacting her dr office to seek advice. Reviewed HEP with good recall and eccentric movements displayed. Improved GT extension with toe yoga today, has been practicing at  [employer]

## 2025-03-13 ENCOUNTER — HOSPITAL ENCOUNTER (OUTPATIENT)
Dept: PHYSICAL THERAPY | Facility: CLINIC | Age: 60
Setting detail: THERAPIES SERIES
Discharge: HOME | End: 2025-03-13
Payer: COMMERCIAL

## 2025-03-13 PROCEDURE — 97110 THERAPEUTIC EXERCISES: CPT

## 2025-03-13 PROCEDURE — 97016 VASOPNEUMATIC DEVICE THERAPY: CPT

## 2025-03-13 NOTE — FLOWSHEET NOTE
Rehab Codes: R26.2, M25.572, M25.672  Onset date: 1/11/25               Next Dr's appt.: 4/3/25       Visit# / total visits: 4/18 PN visit 10   Cancels/No Shows:     Subjective:    Pain:  [x] Yes  [] No Location: L Lateral Ankle and Foot       Pain Rating: (0-10 scale) 2/10  Pain altered Tx:  [] No  [] Yes  Action:  Comments: Patient arrives ambulating in the boot with one crutch.  Patient states she is compliant with her home program. She is using an extra sock, which seems to help the pain in the lateral ankle from the boot.    Objective:    DF 2 degrees    Today’s Treatment:  Modalities: Vaso compression 15' 36 degrees low pressure  Precautions [] No  [x] Yes: WBAT in boot until 3/20/25  Exercises:  Exercise Reps/ Time Weight/ Level Comments   Calf stretch- long sit 3@30\"       SLR flexion 10x2  1#     SLR abduction 10x2  1#     SLR prone 10x2  1#     Seated toe raises 20       Seated heel raises 20       Seated arch lifts 20x3\"       Toe yoga 15ea      LAX Ball Rolling  2'     TB Toe Press 10x [place]              4-way ankle T-band 20x Lime     Seated Mobo 20x     1/3, 2/4    seated heel slides for DF ROM  10@5\"                                     Other:Manual: Talocrural distraction, talar AP glides in prone, talar tilt and rotations, mid foot and forefoot mobs, great toe extension 10@5\"     Specific Instructions for next treatment: ROM, light strengthening        Treatment Charges: Mins Units Time In/Out   []  Modalities        [x]  Ther Exercise 38 3 9544-3971   []  Neuromuscular Re-ed      []  Gait Training      [x]  Manual Therapy 10 0 9812-2360   []  Ther Activities      []  Aquatics      [x]  Vasocompression 10 1 8899-0732   []  Cervical Traction      []  Other      Total Billable time 58 4           Assessment: [x] Progressing toward goals.STG #1 met. Manual to improve joint mobility of foot and ankle.  Therapeutic exercises for ROM and strength. Added seated heel slide for DF ROM. Progressed

## 2025-03-17 ENCOUNTER — HOSPITAL ENCOUNTER (OUTPATIENT)
Dept: PHYSICAL THERAPY | Facility: CLINIC | Age: 60
Setting detail: THERAPIES SERIES
Discharge: HOME | End: 2025-03-17
Payer: COMMERCIAL

## 2025-03-17 PROCEDURE — 97016 VASOPNEUMATIC DEVICE THERAPY: CPT

## 2025-03-17 PROCEDURE — 97110 THERAPEUTIC EXERCISES: CPT

## 2025-03-17 NOTE — FLOWSHEET NOTE
[] [facility]  Outpatient Rehabilitation &  Therapy  [address]  [phone]  [phone] [] [facility]  Outpatient Rehabilitation &  Therapy  [address]  P: [phone]  F: [phone] [x] [facility]  Outpatient Rehabilitation &  Therapy  [address]  P: [phone]  F: [phone] [] [facility]  Outpatient Rehabilitation &  Therapy  [address]  [phone]  [phone] [] [facility]  Outpatient Rehabilitation &  Therapy  [address]   P: [phone]  F: [phone]  [] [facility]  Outpatient Rehabilitation &  Therapy  [address]  P: [phone]  F: [phone] [] [facility]  Outpatient Rehabilitation &  Therapy  [address]  P: [phone]  F: [phone] [] [facility]  Outpatient Rehabilitation &  Therapy  [address]  P: [phone]  F: [phone] [] [facility]  Outpatient Rehabilitation &  Therapy  [address]  P: [phone]  F: [phone]  [] [facility] Outpatient Rehabilitation &  Therapy  [address]  P: [phone]  F: [phone]     Physical Therapy Daily Treatment Note    Date:  3/17/2025  Patient Name:  Shiela Allen    :  1965  MRN: [id number]  Physician: Mariel Unger PA-C                                Insurance: [employer] PPO: Yakov yr (3/1/25 - 26) -  vs remain, hard max, not combined; no auth; no copay or ded; coins 20%; oop 2000 remain.CPT codes verified:  10723, 78391, 95072, 42471, 16480, 99031, 94203, 63964, 57708, 01052, 11228 (ALL CODES BASED ON MEDICAL NECESSITY)  Excluded: ,   Medical Diagnosis: Traumatic closed nondisplaced fracture of distal end of left fibula, with routine healing, subsequent encounter (S82.834G)

## 2025-03-25 ENCOUNTER — APPOINTMENT (OUTPATIENT)
Dept: PHYSICAL THERAPY | Facility: CLINIC | Age: 60
End: 2025-03-25
Payer: COMMERCIAL

## 2025-03-27 ENCOUNTER — HOSPITAL ENCOUNTER (OUTPATIENT)
Dept: PHYSICAL THERAPY | Facility: CLINIC | Age: 60
Setting detail: THERAPIES SERIES
Discharge: HOME | End: 2025-03-27
Payer: COMMERCIAL

## 2025-03-27 PROCEDURE — 97110 THERAPEUTIC EXERCISES: CPT

## 2025-03-27 PROCEDURE — 97016 VASOPNEUMATIC DEVICE THERAPY: CPT

## 2025-03-27 NOTE — FLOWSHEET NOTE
[] [facility]  Outpatient Rehabilitation &  Therapy  [address]  [phone]  [phone] [] [facility]  Outpatient Rehabilitation &  Therapy  [address]  P: [phone]  F: [phone] [x] [facility]  Outpatient Rehabilitation &  Therapy  [address]  P: [phone]  F: [phone] [] [facility]  Outpatient Rehabilitation &  Therapy  [address]  [phone]  [phone] [] [facility]  Outpatient Rehabilitation &  Therapy  [address]   P: [phone]  F: [phone]  [] [facility]  Outpatient Rehabilitation &  Therapy  [address]  P: [phone]  F: [phone] [] [facility]  Outpatient Rehabilitation &  Therapy  [address]  P: [phone]  F: [phone] [] [place]  Outpatient Rehabilitation &  Therapy  [address]  P: [phone]  F: [phone] [] [facility]  Outpatient Rehabilitation &  Therapy  [address]  P: [phone]  F: [phone]  [] [facility] Outpatient Rehabilitation &  Therapy  [address]  P: [phone]  F: [phone]     Physical Therapy Daily Treatment Note    Date:  3/27/2025  Patient Name:  Shiela Allen    :  1965  MRN: [id number]  Physician: Mariel Unger PA-C                                Insurance: [employer] PPO: Yakov yr (3/1/25 - 26) -  vs remain, hard max, not combined; no auth; no copay or ded; coins 20%; oop 2000 remain.CPT codes verified:  12627, 08138, 73935, 49010, 32371, 62880, 49096, 15095, 71855, 92812, 47689 (ALL CODES BASED ON MEDICAL NECESSITY)  Excluded: ,   Medical Diagnosis: Traumatic closed nondisplaced fracture of distal end of left fibula, with routine healing, subsequent encounter (S82.830W)

## 2025-03-28 NOTE — PATIENT INSTRUCTIONS
PATIENTIQ:  PatientIQ helps [facility] stay in touch with you to know how you're feeling, and provides education and care instructions to you at various time points.   Your answers help your care team track your progress to provide the best care possible. PatientIQ will contact you pre-op and post-op via email or text with:  Educational Videos and Care Instructions  Questionnaires About How You're Feeling    Your participation provides you valuable education and helps [facility] continue to provide quality care to all patients. Thank you

## 2025-03-31 ENCOUNTER — HOSPITAL ENCOUNTER (OUTPATIENT)
Dept: PHYSICAL THERAPY | Facility: CLINIC | Age: 60
Setting detail: THERAPIES SERIES
Discharge: HOME | End: 2025-03-31
Payer: COMMERCIAL

## 2025-03-31 PROCEDURE — 97110 THERAPEUTIC EXERCISES: CPT

## 2025-03-31 PROCEDURE — 97140 MANUAL THERAPY 1/> REGIONS: CPT

## 2025-03-31 NOTE — FLOWSHEET NOTE
[] [facility]  Outpatient Rehabilitation &  Therapy  [address]  [phone]  [phone] [] [facility]  Outpatient Rehabilitation &  Therapy  [address]  P: [phone]  F: [phone] [x] [facility]  Outpatient Rehabilitation &  Therapy  [address]  P: [phone]  F: [phone] [] [facility]  Outpatient Rehabilitation &  Therapy  [address]  [phone]  [phone] [] [facility]  Outpatient Rehabilitation &  Therapy  [address]   P: [phone]  F: [phone]  [] [facility]  Outpatient Rehabilitation &  Therapy  [address]  P: [phone]  F: [phone] [] [facility]  Outpatient Rehabilitation &  Therapy  [address]  P: [phone]  F: [phone] [] [facility]  Outpatient Rehabilitation &  Therapy  [address]  P: [phone]  F: [phone] [] [facility]  Outpatient Rehabilitation &  Therapy  [address]  P: [phone]  F: [phone]  [] [facility] Outpatient Rehabilitation &  Therapy  [address]  P: [phone]  F: [phone]     Physical Therapy Daily Treatment Note    Date:  3/31/2025  Patient Name:  Shiela Allen    :  1965  MRN: [id number]  Physician: Mariel Unger PA-C                                Insurance: Regis PPO: Yakov yr (3/1/25 - 26) -  vs remain, hard max, not combined; no auth; no copay or ded; coins 20%; oop 2000 remain.CPT codes verified:  46390, 47553, 53797, 07342, 76957, 09249, 18334, 84676, 41972, 26463, 53947 (ALL CODES BASED ON MEDICAL NECESSITY)  Excluded: ,   Medical Diagnosis: Traumatic closed nondisplaced fracture of distal end of left fibula, with routine healing, subsequent encounter (S82.831G)

## 2025-04-01 DIAGNOSIS — S82.832D TRAUMATIC CLOSED NONDISPLACED FRACTURE OF DISTAL END OF LEFT FIBULA, WITH ROUTINE HEALING, SUBSEQUENT ENCOUNTER: Primary | ICD-10-CM

## 2025-04-02 ENCOUNTER — HOSPITAL ENCOUNTER (OUTPATIENT)
Dept: PHYSICAL THERAPY | Facility: CLINIC | Age: 60
Setting detail: THERAPIES SERIES
Discharge: HOME | End: 2025-04-02
Payer: COMMERCIAL

## 2025-04-02 PROCEDURE — 97016 VASOPNEUMATIC DEVICE THERAPY: CPT

## 2025-04-02 PROCEDURE — 97140 MANUAL THERAPY 1/> REGIONS: CPT

## 2025-04-02 PROCEDURE — 97110 THERAPEUTIC EXERCISES: CPT

## 2025-04-02 NOTE — FLOWSHEET NOTE
[] [facility]  Outpatient Rehabilitation &  Therapy  [address]  [phone]  [phone] [] [facility]  Outpatient Rehabilitation &  Therapy  [address]  P: [phone]  F: [phone] [x] [facility]  Outpatient Rehabilitation &  Therapy  [address]  P: [phone]  F: [phone] [] [facility]  Outpatient Rehabilitation &  Therapy  [address]  [phone]  [phone] [] [facility]  Outpatient Rehabilitation &  Therapy  [address]   P: [phone]  F: [phone]  [] [facility]  Outpatient Rehabilitation &  Therapy  [address]  P: [phone]  F: [phone] [] [facility]  Outpatient Rehabilitation &  Therapy  [address]  P: [phone]  F: [phone] [] [facility]  Outpatient Rehabilitation &  Therapy  [address]  P: [phone]  F: [phone] [] [facility]  Outpatient Rehabilitation &  Therapy  [address]  P: [phone]  F: [phone]  [] [facility] Outpatient Rehabilitation &  Therapy  [address]  P: [phone]  F: [phone]     Physical Therapy Daily Treatment Note    Date:  2025  Patient Name:  Shiela Allen    :  1965  MRN: [id number]  Physician: Mariel Unger PA-C                                Insurance: Regis PPO: Yakov yr (3/1/25 - 26) -  vs remain, hard max, not combined; no auth; no copay or ded; coins 20%; oop 2000 remain.CPT codes verified:  36292, 71119, 79679, 57923, 60352, 53808, 68879, 18860, 56351, 23968, 95600 (ALL CODES BASED ON MEDICAL NECESSITY)  Excluded: ,   Medical Diagnosis: Traumatic closed nondisplaced fracture of distal end of left fibula, with routine healing, subsequent encounter (S82.830G)

## 2025-04-03 ENCOUNTER — OFFICE VISIT (OUTPATIENT)
Dept: ORTHOPEDIC SURGERY | Age: 60
End: 2025-04-03
Payer: COMMERCIAL

## 2025-04-03 VITALS — HEIGHT: 65 IN | OXYGEN SATURATION: 97 % | BODY MASS INDEX: 21.66 KG/M2 | RESPIRATION RATE: 17 BRPM | WEIGHT: 130 LBS

## 2025-04-03 DIAGNOSIS — S82.832D TRAUMATIC CLOSED NONDISPLACED FRACTURE OF DISTAL END OF LEFT FIBULA, WITH ROUTINE HEALING, SUBSEQUENT ENCOUNTER: Primary | ICD-10-CM

## 2025-04-03 PROCEDURE — 99213 OFFICE O/P EST LOW 20 MIN: CPT | Performed by: PHYSICIAN ASSISTANT

## 2025-04-03 ASSESSMENT — ENCOUNTER SYMPTOMS
APNEA: 0
CONSTIPATION: 0
ABDOMINAL DISTENTION: 0
NAUSEA: 0
GASTROINTESTINAL NEGATIVE: 1
CHEST TIGHTNESS: 0
SHORTNESS OF BREATH: 0
COLOR CHANGE: 0
RESPIRATORY NEGATIVE: 1
VOMITING: 0
COUGH: 0
ABDOMINAL PAIN: 0
DIARRHEA: 0

## 2025-04-08 ENCOUNTER — HOSPITAL ENCOUNTER (OUTPATIENT)
Dept: PHYSICAL THERAPY | Facility: CLINIC | Age: 60
Setting detail: THERAPIES SERIES
Discharge: HOME | End: 2025-04-08
Payer: COMMERCIAL

## 2025-04-08 PROCEDURE — 97110 THERAPEUTIC EXERCISES: CPT

## 2025-04-08 NOTE — PROGRESS NOTES
[] [facility]  Outpatient Rehabilitation &  Therapy  [address]  [phone]  [phone] [] [facility]  Outpatient Rehabilitation &  Therapy  [address]  P: [phone]  F: [phone] [x] [facility]  Outpatient Rehabilitation &  Therapy  [address]  P: [phone]  F: [phone] [] [facility]  Outpatient Rehabilitation &  Therapy  [address]  [phone]  [phone] [] [facility]  Outpatient Rehabilitation &  Therapy  [address]   P: [phone]  F: [phone]  [] [facility]  Outpatient Rehabilitation &  Therapy  [address]  P: [phone]  F: [phone] [] [facility]  Outpatient Rehabilitation &  Therapy  [address]  P: [phone]  F: [phone] [] [facility]  Outpatient Rehabilitation &  Therapy  [address]  P: [phone]  F: [phone] [] [facility]  Outpatient Rehabilitation &  Therapy  [address]  P: [phone]  F: [phone]  [] [facility] Outpatient Rehabilitation &  Therapy  [address]  P: [phone]  F: [phone]     Physical Therapy Daily Treatment Note/Progress Note/Possible D/C    Date:  2025  Patient Name:  Shiela Allen    :  1965  MRN: [id number]  Physician: Mariel Unger PA-C                                Insurance: Regis PPO: Yakov yr (3/1/25 - 26) -  vs remain, hard max, not combined; no auth; no copay or ded; coins 20%; oop 2000 remain.CPT codes verified:  07620, 03347, 52014, 52527, 51355, 94044, 05534, 99997, 52833, 74442, 67510 (ALL CODES BASED ON MEDICAL NECESSITY)  Excluded: ,   Medical Diagnosis: Traumatic closed nondisplaced fracture of distal end of left fibula, with routine healing, subsequent

## 2025-04-10 ENCOUNTER — APPOINTMENT (OUTPATIENT)
Dept: PHYSICAL THERAPY | Facility: CLINIC | Age: 60
End: 2025-04-10
Payer: COMMERCIAL

## 2025-07-03 DIAGNOSIS — S82.832D TRAUMATIC CLOSED NONDISPLACED FRACTURE OF DISTAL END OF LEFT FIBULA, WITH ROUTINE HEALING, SUBSEQUENT ENCOUNTER: Primary | ICD-10-CM

## 2025-07-08 ENCOUNTER — OFFICE VISIT (OUTPATIENT)
Dept: ORTHOPEDIC SURGERY | Age: 60
End: 2025-07-08
Payer: COMMERCIAL

## 2025-07-08 VITALS — OXYGEN SATURATION: 98 % | BODY MASS INDEX: 21.66 KG/M2 | WEIGHT: 130 LBS | RESPIRATION RATE: 17 BRPM | HEIGHT: 65 IN

## 2025-07-08 DIAGNOSIS — S82.832D TRAUMATIC CLOSED NONDISPLACED FRACTURE OF DISTAL END OF LEFT FIBULA, WITH ROUTINE HEALING, SUBSEQUENT ENCOUNTER: Primary | ICD-10-CM

## 2025-07-08 PROCEDURE — 99213 OFFICE O/P EST LOW 20 MIN: CPT | Performed by: PHYSICIAN ASSISTANT

## 2025-07-08 ASSESSMENT — ENCOUNTER SYMPTOMS
ABDOMINAL DISTENTION: 0
RESPIRATORY NEGATIVE: 1
CHEST TIGHTNESS: 0
COUGH: 0
CONSTIPATION: 0
SHORTNESS OF BREATH: 0
VOMITING: 0
COLOR CHANGE: 0
APNEA: 0
NAUSEA: 0
GASTROINTESTINAL NEGATIVE: 1
ABDOMINAL PAIN: 0
DIARRHEA: 0

## 2025-07-08 NOTE — PROGRESS NOTES
I have reviewed the CC, HPI, ROS, PMH, FHX, Social History, and if not present in this note, I have reviewed in the patient's chart. I agree with the documentation provided by other staff and have reviewed their documentation prior to providing my signature indicating agreement.  Vitals:   Resp 17   Ht 1.651 m (5' 5\")   Wt 59 kg (130 lb)   SpO2 98%   BMI 21.63 kg/m²  Body mass index is 21.63 kg/m².  Physical Examination:     Orthopedics:    GENERAL: Alert and oriented X3 in no acute distress.  SKIN: Intact without lesions or ulcerations.  NEURO:  Sensation intact to light touch to sural/saphenous/SPN/DPN/plantar nerves. Motor intact to EHL/FHL/TA/GS.   VASC: Capillary refill is less than 3 seconds. Distal pulses are palpable.  There is no lymphadenopathy.    Fracture:    LOCATION: Left ankle  SITE: Distal neurocirculatory status is intact.  EXAM: Sensation is intact to light touch, there is full motor function of the extremity.      Physical Exam  Musculoskeletal:  Right ankle: No pain to palpation of the fracture site, callus formation of the distal fibula noted that can be palpated.  0 degrees of dorsiflexion, 45 degrees of plantar flexion, 20 degrees of inversion and 20 degrees of eversion.  Joint stiffness, tight capsule    Assessment:     1. Traumatic closed nondisplaced fracture of distal end of left fibula, with routine healing, subsequent encounter      Procedures:    Procedure: None  Radiology:   History: Left distal fibula fracture     Findings:   ANKLE X-RAY     Three views of the left ankle partial weightbearing reveal a nondisplaced oblique fracture of the distal fibula.  Ankle mortise is maintained with no widening of the clear spaces.  There is not soft tissue swelling adjacent to the lateral malleolus.  No other fractures, dislocations or other bony abnormalities noted.  No radiopaque foreign body/tumors.  Progressive healing since previous x-ray on 4/3/2025.     Impression: Healed Distal

## 2025-07-08 NOTE — PATIENT INSTRUCTIONS
PATIENTIQ:  PatientIQ helps [facility] stay in touch with you to know how you're feeling, and provides education and care instructions to you at various time points.   Your answers help your care team track your progress to provide the best care possible. PatientIQ will contact you pre-op and post-op via email or text with:  Educational Videos and Care Instructions  Questionnaires About How You're Feeling    Your participation provides you valuable education and helps [facility] continue to provide quality care to all patients. Thank you